# Patient Record
Sex: MALE | Race: WHITE | Employment: UNEMPLOYED | URBAN - METROPOLITAN AREA
[De-identification: names, ages, dates, MRNs, and addresses within clinical notes are randomized per-mention and may not be internally consistent; named-entity substitution may affect disease eponyms.]

---

## 2017-06-22 ENCOUNTER — OFFICE VISIT (OUTPATIENT)
Dept: INTERNAL MEDICINE CLINIC | Facility: CLINIC | Age: 32
End: 2017-06-22

## 2017-06-22 VITALS
WEIGHT: 165 LBS | HEART RATE: 86 BPM | OXYGEN SATURATION: 97 % | SYSTOLIC BLOOD PRESSURE: 120 MMHG | TEMPERATURE: 99 F | BODY MASS INDEX: 22.59 KG/M2 | HEIGHT: 71.75 IN | DIASTOLIC BLOOD PRESSURE: 80 MMHG

## 2017-06-22 DIAGNOSIS — S49.91XD SHOULDER INJURY, RIGHT, SUBSEQUENT ENCOUNTER: Primary | ICD-10-CM

## 2017-06-22 DIAGNOSIS — F41.9 ANXIETY: ICD-10-CM

## 2017-06-22 PROCEDURE — 99214 OFFICE O/P EST MOD 30 MIN: CPT | Performed by: INTERNAL MEDICINE

## 2017-06-22 PROCEDURE — 99212 OFFICE O/P EST SF 10 MIN: CPT | Performed by: INTERNAL MEDICINE

## 2017-06-22 RX ORDER — ALPRAZOLAM 0.5 MG/1
TABLET ORAL
Qty: 70 TABLET | Refills: 0 | Status: SHIPPED
Start: 2017-06-22 | End: 2017-07-17

## 2017-06-22 RX ORDER — RUFINAMIDE 40 MG/ML
2 SUSPENSION ORAL DAILY
COMMUNITY

## 2017-06-22 RX ORDER — METHOCARBAMOL 500 MG/1
500 TABLET, FILM COATED ORAL 3 TIMES DAILY PRN
Qty: 60 TABLET | Refills: 0 | Status: SHIPPED | OUTPATIENT
Start: 2017-06-22

## 2017-06-22 NOTE — PATIENT INSTRUCTIONS
1) taper off xanax. Take 1mg (2 tablets) in the morning and 0.5mg (1 tablet) nightly for 2 weeks. Then take 0.5mg (1 tablet) in the morning and at night for 2 weeks. 2) schedule your MRI if possible. 3) call to schedule with orthopedic.      4)
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
## 2017-06-23 ENCOUNTER — TELEPHONE (OUTPATIENT)
Dept: INTERNAL MEDICINE CLINIC | Facility: CLINIC | Age: 32
End: 2017-06-23

## 2017-06-23 DIAGNOSIS — S49.91XS RIGHT SHOULDER INJURY, SEQUELA: Primary | ICD-10-CM

## 2017-06-23 NOTE — TELEPHONE ENCOUNTER
Patient notified that Amira Frias is not a participating provider, patient provided with 's contact information and notified that referral placed and authorized.

## 2017-06-23 NOTE — TELEPHONE ENCOUNTER
Dr Mayuri Toledo is not a participating provider; please redirect to [id number].      Thank you, Baudilio

## 2017-06-29 ENCOUNTER — TELEPHONE (OUTPATIENT)
Dept: INTERNAL MEDICINE CLINIC | Facility: CLINIC | Age: 32
End: 2017-06-29

## 2017-06-29 NOTE — TELEPHONE ENCOUNTER
Pt. Is calling because the methocarbamol isn't working for him. He would like to be prescribed something else he can't tolerate aspirin or ibuprofen due to an gastric ulcer.  He has tried flexeril, it makes him spaced out, hydrocodone, methocarbamol, & mobi

## 2017-06-30 RX ORDER — TRAMADOL HYDROCHLORIDE 50 MG/1
50 TABLET ORAL EVERY 6 HOURS PRN
Qty: 30 TABLET | Refills: 1 | COMMUNITY
Start: 2017-06-30

## 2017-06-30 NOTE — TELEPHONE ENCOUNTER
Ok to call in ultram 50 q6h prn #30. Please notify patient he needs to get MRI and orthopedic evaluation. Pain medication is not the answer. Would also recommend he try a salon pas patch to help with the pain.

## 2017-06-30 NOTE — TELEPHONE ENCOUNTER
Can try taking 2 tables of the robaxin every 8 hours. Please insist that he schedule his MRI so we can determine what is going on with the shoulder. Pain medications are covering up the problem, and until we have an MRI, we do not know what the problem is.

## 2017-06-30 NOTE — TELEPHONE ENCOUNTER
Please advise - called patient and relayed DR. BROWN message - patient states he tried taking 2 Robaxin yesterday - he gets upset stomach ( not tolerating aspirin or ibuprofen. Flexeril hydrocodone ) states oxycodone works well.  Patient has MRI on Monday 7/3 an

## 2017-07-17 ENCOUNTER — OFFICE VISIT (OUTPATIENT)
Dept: INTERNAL MEDICINE CLINIC | Facility: CLINIC | Age: 32
End: 2017-07-17

## 2017-07-17 ENCOUNTER — TELEPHONE (OUTPATIENT)
Dept: INTERNAL MEDICINE CLINIC | Facility: CLINIC | Age: 32
End: 2017-07-17

## 2017-07-17 VITALS
HEIGHT: 71.75 IN | HEART RATE: 80 BPM | WEIGHT: 169 LBS | TEMPERATURE: 98 F | BODY MASS INDEX: 23.14 KG/M2 | RESPIRATION RATE: 16 BRPM | SYSTOLIC BLOOD PRESSURE: 140 MMHG | DIASTOLIC BLOOD PRESSURE: 70 MMHG

## 2017-07-17 DIAGNOSIS — S49.91XS RIGHT SHOULDER INJURY, SEQUELA: Primary | ICD-10-CM

## 2017-07-17 DIAGNOSIS — F41.9 ANXIETY: ICD-10-CM

## 2017-07-17 PROCEDURE — 99213 OFFICE O/P EST LOW 20 MIN: CPT | Performed by: INTERNAL MEDICINE

## 2017-07-17 PROCEDURE — 99212 OFFICE O/P EST SF 10 MIN: CPT | Performed by: INTERNAL MEDICINE

## 2017-07-17 RX ORDER — MAG HYDROX/ALUMINUM HYD/SIMETH 400-400-40
SUSPENSION, ORAL (FINAL DOSE FORM) ORAL
COMMUNITY

## 2017-07-17 RX ORDER — ALPRAZOLAM 0.25 MG/1
TABLET ORAL
Qty: 70 TABLET | Refills: 0 | Status: SHIPPED
Start: 2017-07-17

## 2017-07-17 NOTE — PROGRESS NOTES
Melissa Damon is a 29year old male. Patient presents with:  Shoulder Pain: 1 month right shoulder injury follow up. Patient states current pain is 8/10 and has not been getting better.       HPI:   Melissa Damon is a 29year old male who presents fo Smoker                                                   Packs/day: 0.25      Years: 12.00        Types: Cigarettes  Smokeless tobacco: Never Used                      Comment: 1 pack/week  Alcohol use:  No                   REVIEW OF SYSTEMS:   GENERAL: fe

## 2017-07-18 ENCOUNTER — TELEPHONE (OUTPATIENT)
Dept: INTERNAL MEDICINE CLINIC | Facility: CLINIC | Age: 32
End: 2017-07-18
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
## 2017-07-19 NOTE — TELEPHONE ENCOUNTER
Patient called back - he will be going to ER tomorrow morning ( mother will take him) . Soreness in neck #3 .
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
## 2017-07-19 NOTE — TELEPHONE ENCOUNTER
To MD:  The above refill request is for a controlled substance. Please indicate yes or no to refill 30 days supply plus one refill.   If more refills are appropriate, please indicate quantity  Pending - was sent to wrong pharmacy  - right pharmacy entered

## 2017-07-20 NOTE — TELEPHONE ENCOUNTER
Patient called regarding alprazolam Rx faxed to wrong pharmacy at 7/17/17 office visit.  I contacted [facility] in [facility] where the Rx was faxed and cancelled it there (patient had not picked-up Rx) and called-in the Rx to the [facility] in [facility] [place]

## 2017-07-20 NOTE — TELEPHONE ENCOUNTER
Pt is asking foir pain medication. Pain started 25 min.ago - in the neck, spine, and shoulder blade - more on the R side.   T: [phone]     Tasked to Nursing

## 2017-07-20 NOTE — TELEPHONE ENCOUNTER
Pt will not be able to get a ride to come in the office today, pt has been using ice & heat it helps for about 5 min, ibupropen it bothers his stomach so he will not take that anymore  Pt took his remaining Oxycodone which helped, pt would like a script ca

## 2017-07-20 NOTE — TELEPHONE ENCOUNTER
Spoke with patient. He states he went to ER at [facility] in [facility] yesterday. States they did 5 or 6 xrays of back/shoulder and sent him home with no pain medication. He was instructed on home care and told to rest. He was to see PCP in 2-3 days.

## 2017-07-21 NOTE — TELEPHONE ENCOUNTER
Pt informed Dr. Stephanie Patel does not feel comfortable writing note at this time d/t pt needs to have MRI done. Pt states MRI scheduled with [facility] 11/11/17. Pt asking what can he do while he waits.  Informed of previous messages from Dr. Chula Wan states he will pi

## 2017-07-21 NOTE — TELEPHONE ENCOUNTER
Would recommend that he try salonpas patches and extra strength tylenol. He was prescribed valium on 7/13 which should help as well. He should ICE as much as possible.    We will try to obtain xray results today from [facility] to see if any fractures are note

## 2017-07-21 NOTE — TELEPHONE ENCOUNTER
X-ray results received. Pt called and informed x-rays are normal. Pt states he knows the x-rays are normal. Continues to c/o extreme pain to neck, back and right shoulder. Pt informed to continue with what Dr. Stephanie Patel recommended.  Also informed pt to follow up M

## 2017-07-21 NOTE — TELEPHONE ENCOUNTER
Spoke with pt and relayed message below. Pt states he is already icing his back, neck and shoulder and using salonpas patches with tylenol with no relief. States valium helps with shoulder pain but not back pain.  Pt informed to follow up with Dr. Breana Summers Monday

## 2017-07-24 NOTE — TELEPHONE ENCOUNTER
Received records from [address] [place] [address] Road:  7/19/17: Xray lumbar spine: no evidence of acute fracutre or malalignment  7/19/17: Xray cervical spine: no evidence of acute fracture or malalignment.    7/19/17: xray R shoulder: no evidence of acute frac

## 2017-07-25 ENCOUNTER — TELEPHONE (OUTPATIENT)
Dept: ORTHOPEDICS CLINIC | Facility: CLINIC | Age: 32
End: 2017-07-25

## 2017-11-29 ENCOUNTER — HOSPITAL (OUTPATIENT)
Dept: OTHER | Age: 32
End: 2017-11-29
Attending: PHYSICIAN ASSISTANT

## 2018-01-09 RX ORDER — ALPRAZOLAM 0.25 MG/1
TABLET ORAL
Qty: 70 TABLET | Refills: 0 | Status: CANCELLED | OUTPATIENT
Start: 2018-01-09

## 2018-01-13 NOTE — TELEPHONE ENCOUNTER
Will not refill-please notify pharmacy. Please notify pharmacy that I suspect drug abuse-he should only be getting fills from one doctor.

## 2018-01-15 NOTE — TELEPHONE ENCOUNTER
Kaylee Cortes PharmD - refill refused  LM TCB for pt  - can give call to [place] or  if pt calls back

## (undated) NOTE — MR AVS SNAPSHOT
[facility] [address] [address]  [phone]               Thank you for choosing us for your health care visit with Ange Nance DO. We are glad to serve you and happy to provide you with this summary of your visit.   Please he If you've recently had a stay at the Hospital you can access your discharge instructions in [facility] by going to Visits < Admission Summaries.  If you've been to the Emergency Department or your doctor's office, you can view your past visit information in My